# Patient Record
Sex: FEMALE | Race: OTHER | HISPANIC OR LATINO | Employment: FULL TIME | URBAN - METROPOLITAN AREA
[De-identification: names, ages, dates, MRNs, and addresses within clinical notes are randomized per-mention and may not be internally consistent; named-entity substitution may affect disease eponyms.]

---

## 2018-01-13 NOTE — MISCELLANEOUS
Message   Recorded as Task   Date: 06/26/2016 11:13 PM, Created By: Rafal Weber   Task Name: Go to Note   Assigned To: Ricki Chance   Regarding Patient: Adri Diaz, Status: Active   Comment:    Nik Cortez - 26 Jun 2016 11:13 PM     TASK CREATED  GC/chlam neg, please inform pt        Active Problems    1  Birth control counseling (V25 09) (Z30 9)   2  Breast lump on right side at 11 o'clock position (611 72) (N63)   3  Cervical cancer screening (V76 2) (Z12 4)   4  Encounter for routine gynecological examination (V72 31) (Z01 419)   5  History of self breast exam   6  Potential exposure to STD (V01 6) (Z20 2)   7  Screening for HPV (human papillomavirus) (V73 81) (Z11 51)    Current Meds   1  Collagen CAPS; Therapy: (Recorded:22Jun2016) to Recorded   2  Multi-Vitamin Daily Oral Tablet; Therapy: (Recorded:09Feb2015) to Recorded   3  Vitamin E TABS; Therapy: (Recorded:09Feb2015) to Recorded    Allergies    1  Latex Exam Gloves MISC    2   Latex    Signatures   Electronically signed by : Shawn Shahid, ; Jun 27 2016 10:12AM EST                       (Author)

## 2018-01-15 NOTE — MISCELLANEOUS
Message   Recorded as Task   Date: 06/22/2016 10:47 AM, Created By: Mahin Mello   Task Name: Miscellaneous   Assigned To: Nunu Loredo   Regarding Patient: Clifford Lemus, Status: In Progress   Comment:    Nik Cortez - 22 Jun 2016 10:47 AM     TASK CREATED  Patient is interested in C/ [address]  Please check with insurance company about coverage and arrange for insertion  Thanks   [facility] - 22 Jun 2016 2:24 PM     TASK EDITED  C/ [address] is not covered by insurance  Lm for pt   [facility] - 22 Jun 2016 2:24 PM     TASK IN PROGRESS   [facility] - 23 Jun 2016 9:49 AM     TASK EDITED  Tried to reach patient, no answer or machine  Active Problems    1  Birth control counseling (V25 09) (Z30 9)   2  Breast lump on right side at 11 o'clock position (611 72) (N63)   3  Cervical cancer screening (V76 2) (Z12 4)   4  Encounter for routine gynecological examination (V72 31) (Z01 419)   5  History of self breast exam   6  Potential exposure to STD (V01 6) (Z20 2)   7  Screening for HPV (human papillomavirus) (V73 81) (Z11 51)    Current Meds   1  Collagen CAPS; Therapy: (Recorded:22Jun2016) to Recorded   2  Multi-Vitamin Daily Oral Tablet; Therapy: (Recorded:52Psm7075) to Recorded   3  Vitamin E TABS; Therapy: (Recorded:[id number]) to Recorded    Allergies    1  Latex Exam Gloves MISC    2  Latex    Signatures   Electronically signed by :  Rosanna Sevilla, ; Jun 23 2016  9:50AM EST                       (Author)

## 2018-01-29 ENCOUNTER — TRANSCRIBE ORDERS (OUTPATIENT)
Dept: ADMINISTRATIVE | Facility: HOSPITAL | Age: 38
End: 2018-01-29

## 2018-01-29 DIAGNOSIS — N63.10 LUMP OF RIGHT BREAST: ICD-10-CM

## 2018-01-29 DIAGNOSIS — N64.4 BREAST PAIN, RIGHT: Primary | ICD-10-CM

## 2018-01-29 DIAGNOSIS — N64.52 DISCHARGE OF BREAST: ICD-10-CM

## 2018-02-06 ENCOUNTER — HOSPITAL ENCOUNTER (OUTPATIENT)
Dept: MAMMOGRAPHY | Facility: CLINIC | Age: 38
Discharge: HOME | End: 2018-02-06

## 2018-11-23 ENCOUNTER — TELEPHONE (OUTPATIENT)
Dept: OBGYN CLINIC | Facility: CLINIC | Age: 38
End: 2018-11-23

## 2018-11-23 NOTE — TELEPHONE ENCOUNTER
Patient called with message as noted below  She needs to have breast check so we can examine her and figure out what testing to order  Thanks  Ivana Gonzales MD    ----- Message from Deric Robles sent at 11/21/2018 12:42 PM EST -----  Hi the patient mentioned above called requesting a mammo script  She states she needs a script because she has a lump on her left breast  She is scheduled for a yearly on December 21

## 2018-11-26 ENCOUNTER — TELEPHONE (OUTPATIENT)
Dept: OBGYN CLINIC | Facility: CLINIC | Age: 38
End: 2018-11-26

## 2018-11-26 NOTE — TELEPHONE ENCOUNTER
Please call patient to arrange for breast check  We need to see her so we can do an exam and figure out which type of breast imaging she requires    Thanks

## 2018-11-27 ENCOUNTER — OFFICE VISIT (OUTPATIENT)
Dept: OBGYN CLINIC | Facility: CLINIC | Age: 38
End: 2018-11-27
Payer: COMMERCIAL

## 2018-11-27 VITALS
BODY MASS INDEX: 23.39 KG/M2 | HEIGHT: 64 IN | DIASTOLIC BLOOD PRESSURE: 88 MMHG | WEIGHT: 137 LBS | SYSTOLIC BLOOD PRESSURE: 118 MMHG

## 2018-11-27 DIAGNOSIS — B37.3 VAGINAL CANDIDIASIS: ICD-10-CM

## 2018-11-27 DIAGNOSIS — N63.0 BREAST LUMP: ICD-10-CM

## 2018-11-27 DIAGNOSIS — B96.89 BACTERIAL VAGINOSIS: Primary | ICD-10-CM

## 2018-11-27 DIAGNOSIS — N76.0 BACTERIAL VAGINOSIS: Primary | ICD-10-CM

## 2018-11-27 DIAGNOSIS — N89.8 VAGINAL CYST: ICD-10-CM

## 2018-11-27 LAB — SL AMB POCT WET MOUNT: 5

## 2018-11-27 PROCEDURE — 87210 SMEAR WET MOUNT SALINE/INK: CPT | Performed by: OBSTETRICS & GYNECOLOGY

## 2018-11-27 PROCEDURE — 99215 OFFICE O/P EST HI 40 MIN: CPT | Performed by: OBSTETRICS & GYNECOLOGY

## 2018-11-27 RX ORDER — CYANOCOBALAMIN (VITAMIN B-12) 500 MCG
LOZENGE ORAL
COMMUNITY

## 2018-11-27 RX ORDER — FLUCONAZOLE 150 MG/1
150 TABLET ORAL ONCE
Qty: 2 TABLET | Refills: 0 | Status: SHIPPED | OUTPATIENT
Start: 2018-11-27 | End: 2018-11-27

## 2018-11-27 RX ORDER — METRONIDAZOLE 500 MG/1
500 TABLET ORAL 2 TIMES DAILY
Qty: 14 TABLET | Refills: 0 | Status: SHIPPED | OUTPATIENT
Start: 2018-11-27 | End: 2018-12-04

## 2018-11-27 NOTE — PATIENT INSTRUCTIONS
Breast Mass   WHAT YOU NEED TO KNOW:   What do I need to know about a breast mass? A breast mass is a lump or growth in your breast or underarm  A cyst (fluid-filled pocket), an injury, or changes in your breast tissue are the most common causes  A breast mass can also be caused by cancer  You must follow up as directed to find the cause of your breast mass  How is a breast mass evaluated? Your healthcare provider will perform a breast exam to feel the mass  Tell him when you noticed the breast mass, and if it has changed in size  Tell him if you have any other symptoms, such as pain, fever, or nipple discharge  He will ask if you have a personal or family history of breast disease or cancer  He will also ask if you had an injury, biopsy, or surgery on your breast   · Aspiration  is a procedure used to withdraw fluid or cells from your breast mass to be tested for cancer  · A mammogram  is an x-ray to closely examine your breast mass  Healthcare providers will also look for other lumps or tissue changes in your breast      · An ultrasound  uses sound waves to look for a cyst or tumor  Why is it important to continue breast self-exams? Check your breast for changes in size, shape, or feel of the breast tissue  Check under your arms and all around your breasts  If you have monthly periods, examine your breasts after your period is over  Contact your healthcare provider if you notice any changes in your breasts  If you have questions, ask for more information on how to do a breast self-exam         When should I contact my healthcare provider? · Your breast mass returns or grows larger  · You have pain in your breast or underarm  · You have nipple discharge  · You notice other changes to your breasts or nipples, such as dimpling or a rash  · You had an aspiration and you have redness, pain, swelling, or warmth near the aspiration site  · You have a fever       · You have questions or concerns about your condition or care  CARE AGREEMENT:   You have the right to help plan your care  Learn about your health condition and how it may be treated  Discuss treatment options with your caregivers to decide what care you want to receive  You always have the right to refuse treatment  The above information is an  only  It is not intended as medical advice for individual conditions or treatments  Talk to your doctor, nurse or pharmacist before following any medical regimen to see if it is safe and effective for you  © 2017 [employer] Information is for End User's use only and may not be sold, redistributed or otherwise used for commercial purposes  All illustrations and images included in CareNotes® are the copyrighted property of [employer]  or Chris Kimble

## 2018-11-27 NOTE — PROGRESS NOTES
Assessment/Plan:  1  Left breast lump-1 cm fullness noted at 3 o'clock for about 1 cm lateral to the left nipple  It seems to be most consistent with some type of duct cyst for prominent duct  No erythema or warmth or discharge are noted  The patient has noted this ball near nipple and pinching sensation on and off over the past 3 months time  Apparently, she did see Dr Leeanna Cervantes regarding this and diagnostic mammogram and ultrasound of left breast were ordered back in January/February 2018, but they were never done  Request for bilateral diagnostic mammogram and left breast ultrasound was ordered  The patient was instructed to go to the [address] [place] [address],[address] for this  She will follow-up in 1 month time for breast check  2  Bacterial vaginosis/yeast-noted on exam today  Patient was counseled about the findings and treatment options were reviewed  Electronic prescription for metronidazole 500 mg tablets twice daily for 7 days was sent a local pharmacy along with fluconazole 150 mg tablets 2  To take PO x1 with repeat in 1 week time  She was encouraged to avoid alcohol use during this time  3  Vaginal cyst-patient felt a cyst in her vagina  Initially, I could not palpated and thought was a prominent urethra  However, on further palpation, a small 1 cm left vaginal fornix cyst is noted consistent with probable Adriel's duct cyst   The patient was worried about this, but it feels entirely benign  No tenderness or warmth or erythema are appreciated  We will recheck this at the 1 month visit as well  4  Other-patient last seen 2016  She will return in 1 month time for yearly exam along with the breast check/vaginal check as noted above  Visit greater than 45 minutes duration, with greater than 50% time spent counseling, coordinating care, and answering the patient's numerous questions regarding these findings  No problem-specific Assessment & Plan notes found for this encounter         Diagnoses and all orders for this visit:    Bacterial vaginosis  -     metroNIDAZOLE (FLAGYL) 500 mg tablet; Take 1 tablet (500 mg total) by mouth 2 (two) times a day for 7 days  -     POCT wet mount    Vaginal candidiasis  -     fluconazole (DIFLUCAN) 150 mg tablet; Take 1 tablet (150 mg total) by mouth once for 1 dose Repeat in 1 week  -     POCT wet mount    Vaginal cyst    Breast lump  -     Mammo diagnostic bilateral w cad; Future  -     US breast left limited (diagnostic); Future    Other orders  -     Collagen 500 MG CAPS; Take by mouth  -     Multiple Vitamin (MULTI-VITAMIN DAILY PO); Take by mouth  -     Vitamin E 400 units TABS; Take by mouth          Subjective:      Patient ID: Rocío Moffett is a 43 y o  female  Patient was seen today for follow-up visit regarding issues as documented in assessment plan  The following portions of the patient's history were reviewed and updated as appropriate: allergies, current medications, past family history, past medical history, past social history, past surgical history and problem list     Review of Systems   Constitutional: Negative for chills, diaphoresis, fatigue and fever  Respiratory: Negative for apnea, cough, chest tightness, shortness of breath and wheezing  Cardiovascular: Negative for chest pain, palpitations and leg swelling  Gastrointestinal: Negative for abdominal distention, abdominal pain, anal bleeding, constipation, diarrhea, nausea, rectal pain and vomiting  Genitourinary: Positive for vaginal discharge  Negative for difficulty urinating, dyspareunia, dysuria, frequency, hematuria, menstrual problem, pelvic pain, urgency, vaginal bleeding and vaginal pain  Musculoskeletal: Negative for arthralgias, back pain and myalgias  Skin: Negative for color change and rash  Neurological: Negative for dizziness, syncope, light-headedness, numbness and headaches  Hematological: Negative for adenopathy  Does not bruise/bleed easily  Psychiatric/Behavioral: Negative for dysphoric mood and sleep disturbance  The patient is not nervous/anxious  vaginal odor, vaginal cyst, breast lump    Objective:      /88 (BP Location: Right arm, Patient Position: Sitting, Cuff Size: Standard)   Ht 5' 4" (1 626 m)   Wt 62 1 kg (137 lb)   LMP 11/18/2018 (Within Days)   BMI 23 52 kg/m²          Physical Exam    Objective      /88 (BP Location: Right arm, Patient Position: Sitting, Cuff Size: Standard)   Ht 5' 4" (1 626 m)   Wt 62 1 kg (137 lb)   LMP 11/18/2018 (Within Days)   BMI 23 52 kg/m²     General:   alert and oriented, in no acute distress, alert, appears stated age and cooperative   Neck: normal to inspection and palpation   Breast: Symmetric breast implants noted  Right breast without any masses or erythema or discharge appreciated  Left breast with 1 cm fullness at the areola at 3 o'clock, nontender, without any warmth or erythema or fluctuance or discharge  Heart:    Lungs:    Abdomen: soft, non-tender, without masses or organomegaly   Vulva: normal   Vagina: Small amount of white discharge, without erythema noted  1 cm vaginal cyst noted at the vaginal fornix, consistent with probable Adriel's duct cyst   It is nontender on palpation without any warmth or erythema appreciated   Cervix: Small amount of white discharge, without lesions or cervicitis    No Cervical motion tenderness   Uterus: top normal size, anteverted, non-tender   Adnexa: no mass, fullness, tenderness   Rectum: deferred

## 2018-12-21 ENCOUNTER — TELEPHONE (OUTPATIENT)
Dept: OBGYN CLINIC | Facility: CLINIC | Age: 38
End: 2018-12-21

## 2018-12-21 NOTE — TELEPHONE ENCOUNTER
Patient no-show for breast check  This was in follow-up 2 breast finding at the previous visit  Also, I cannot find any imaging studies that were done  Please contact the patient and arrange for her to have studies and follow-up breast check    Thanks

## 2018-12-26 ENCOUNTER — TELEPHONE (OUTPATIENT)
Dept: OBGYN CLINIC | Facility: CLINIC | Age: 38
End: 2018-12-26

## 2020-01-09 ENCOUNTER — DOCTOR'S OFFICE (OUTPATIENT)
Dept: URBAN - METROPOLITAN AREA CLINIC 136 | Facility: CLINIC | Age: 40
Setting detail: OPHTHALMOLOGY
End: 2020-01-09
Payer: COMMERCIAL

## 2020-01-09 DIAGNOSIS — Z41.1: ICD-10-CM

## 2020-01-09 DIAGNOSIS — H04.002: ICD-10-CM

## 2020-01-09 PROCEDURE — 99204 OFFICE O/P NEW MOD 45 MIN: CPT | Performed by: OPHTHALMOLOGY

## 2020-01-09 ASSESSMENT — CONFRONTATIONAL VISUAL FIELD TEST (CVF)
OD_FINDINGS: FULL
OS_FINDINGS: FULL

## 2020-01-09 ASSESSMENT — SUPERFICIAL PUNCTATE KERATITIS (SPK)
OD_SPK: 1+ 2+
OS_SPK: 1+ 2+

## 2020-01-10 ASSESSMENT — REFRACTION_AUTOREFRACTION
OS_AXIS: 92
OS_SPHERE: +0.75
OD_CYLINDER: -0.50
OS_CYLINDER: -0.50
OD_AXIS: 132
OD_SPHERE: +0.25

## 2020-01-10 ASSESSMENT — SPHEQUIV_DERIVED
OS_SPHEQUIV: 0.5
OD_SPHEQUIV: 0

## 2020-01-10 ASSESSMENT — VISUAL ACUITY
OS_BCVA: 20/20
OD_BCVA: 20/20

## 2020-10-13 ENCOUNTER — DOCTOR'S OFFICE (OUTPATIENT)
Dept: URBAN - METROPOLITAN AREA CLINIC 136 | Facility: CLINIC | Age: 40
Setting detail: OPHTHALMOLOGY
End: 2020-10-13
Payer: COMMERCIAL

## 2020-10-13 DIAGNOSIS — Z41.1: ICD-10-CM

## 2020-10-13 DIAGNOSIS — H04.002: ICD-10-CM

## 2020-10-13 PROCEDURE — 92012 INTRM OPH EXAM EST PATIENT: CPT | Performed by: OPHTHALMOLOGY

## 2020-10-13 ASSESSMENT — SUPERFICIAL PUNCTATE KERATITIS (SPK)
OS_SPK: 1+ 2+
OD_SPK: 1+ 2+

## 2020-10-13 ASSESSMENT — SPHEQUIV_DERIVED
OS_SPHEQUIV: 0.5
OD_SPHEQUIV: 0

## 2020-10-13 ASSESSMENT — REFRACTION_AUTOREFRACTION
OD_AXIS: 132
OS_SPHERE: +0.75
OD_SPHERE: +0.25
OD_CYLINDER: -0.50
OS_CYLINDER: -0.50
OS_AXIS: 92

## 2020-10-13 ASSESSMENT — CONFRONTATIONAL VISUAL FIELD TEST (CVF)
OS_FINDINGS: FULL
OD_FINDINGS: FULL

## 2020-10-13 ASSESSMENT — VISUAL ACUITY
OS_BCVA: 20/20
OD_BCVA: 20/20

## 2021-08-12 ENCOUNTER — APPOINTMENT (OUTPATIENT)
Dept: RADIOLOGY | Facility: MEDICAL CENTER | Age: 41
End: 2021-08-12
Payer: COMMERCIAL

## 2021-08-12 ENCOUNTER — OFFICE VISIT (OUTPATIENT)
Dept: URGENT CARE | Facility: MEDICAL CENTER | Age: 41
End: 2021-08-12
Payer: COMMERCIAL

## 2021-08-12 VITALS
WEIGHT: 130 LBS | TEMPERATURE: 99.6 F | OXYGEN SATURATION: 98 % | BODY MASS INDEX: 22.2 KG/M2 | RESPIRATION RATE: 16 BRPM | HEIGHT: 64 IN | SYSTOLIC BLOOD PRESSURE: 135 MMHG | HEART RATE: 66 BPM | DIASTOLIC BLOOD PRESSURE: 92 MMHG

## 2021-08-12 DIAGNOSIS — S83.8X1A SPRAIN OF OTHER LIGAMENT OF RIGHT KNEE, INITIAL ENCOUNTER: Primary | ICD-10-CM

## 2021-08-12 DIAGNOSIS — M25.561 ACUTE PAIN OF RIGHT KNEE: ICD-10-CM

## 2021-08-12 PROCEDURE — 73564 X-RAY EXAM KNEE 4 OR MORE: CPT

## 2021-08-12 PROCEDURE — 99213 OFFICE O/P EST LOW 20 MIN: CPT | Performed by: NURSE PRACTITIONER

## 2021-08-12 RX ORDER — ONDANSETRON 4 MG/1
4 TABLET, ORALLY DISINTEGRATING ORAL EVERY 6 HOURS PRN
Status: SHIPPED | OUTPATIENT
Start: 2021-08-12

## 2021-08-12 RX ORDER — ONDANSETRON 4 MG/1
4 TABLET, FILM COATED ORAL EVERY 8 HOURS PRN
Qty: 20 TABLET | Refills: 0 | Status: SHIPPED | OUTPATIENT
Start: 2021-08-12

## 2021-08-12 RX ADMIN — ONDANSETRON 4 MG: 4 TABLET, ORALLY DISINTEGRATING ORAL at 20:05

## 2021-08-12 NOTE — LETTER
August 12, 2021 3    Patient: Aric Sanchez   YOB: 1980   Date of Visit: 8/12/2021       To Whom It May Concern: It is my medical opinion that Aric Sanchez may return to work on 8/15/2021  If you have any questions or concerns, please don't hesitate to call           Sincerely,        DAVE Ontiveros    CC: No Recipients

## 2021-08-13 NOTE — PATIENT INSTRUCTIONS
Your xray was preliminarily read by your provider  A radiologist will read the xray and you will be notified  You are to Rest, Ice, compression (ace wrap, splint) elevate  Take tylenol or motrin as needed  You are to see your PCP   Go to the ED if symptoms worsen  Take the zofran for nausea as prescribed  You need to see an orthopedic physician as you may need further testing    Knee Pain   WHAT YOU NEED TO KNOW:   Knee pain may start suddenly, or it may be a long-term problem  You may have pain on the side, front, or back of your knee  You may have knee stiffness and swelling  You may hear popping sounds or feel like your knee is giving way or locking up as you walk  You may feel pain when you sit, stand, walk, or climb up and down stairs  Knee pain can be caused by conditions such as obesity, inflammation, or strains or tears in ligaments or tendons  DISCHARGE INSTRUCTIONS:   Return to the emergency department if:   · Your pain is worse, even after treatment  · You cannot bend or straighten your leg completely  · The swelling around your knee does not go down even with treatment  · Your knee is painful and hot to the touch  Contact your healthcare provider if:   · You have questions or concerns about your condition or care  Medicines: You may need any of the following:  · NSAIDs  help decrease swelling and pain or fever  This medicine is available with or without a doctor's order  NSAIDs can cause stomach bleeding or kidney problems in certain people  If you take blood thinner medicine, always ask your healthcare provider if NSAIDs are safe for you  Always read the medicine label and follow directions  · Acetaminophen  decreases pain and fever  It is available without a doctor's order  Ask how much to take and how often to take it  Follow directions   Read the labels of all other medicines you are using to see if they also contain acetaminophen, or ask your doctor or pharmacist  Acetaminophen can cause liver damage if not taken correctly  Do not use more than 4 grams (4,000 milligrams) total of acetaminophen in one day  · Prescription pain medicine  may be given  Ask your healthcare provider how to take this medicine safely  Some prescription pain medicines contain acetaminophen  Do not take other medicines that contain acetaminophen without talking to your healthcare provider  Too much acetaminophen may cause liver damage  Prescription pain medicine may cause constipation  Ask your healthcare provider how to prevent or treat constipation  · Take your medicine as directed  Contact your healthcare provider if you think your medicine is not helping or if you have side effects  Tell him or her if you are allergic to any medicine  Keep a list of the medicines, vitamins, and herbs you take  Include the amounts, and when and why you take them  Bring the list or the pill bottles to follow-up visits  Carry your medicine list with you in case of an emergency  What you can do to manage your symptoms:   · Rest your knee so it can heal   Limit activities that increase your pain  Do low-impact exercises, such as walking or swimming  · Apply ice to help reduce swelling and pain  Use an ice pack, or put crushed ice in a plastic bag  Cover it with a towel before you apply it to your knee  Apply ice for 15 to 20 minutes every hour, or as directed  · Apply compression to help reduce swelling  Use a brace or bandage only as directed  · Elevate your knee to help decrease pain and swelling  Elevate your knee while you are sitting or lying down  Prop your leg on pillows to keep your knee above the level of your heart  · Prevent your knee from moving as directed  Your healthcare provider may put on a cast or splint  You may need to wear a leg brace to stabilize your knee  A leg brace can be adjusted to increase your range of motion as your knee heals         What you can do to prevent knee pain:   · Maintain a healthy weight  Extra weight increases your risk for knee pain  Ask your healthcare provider how much you should weigh  He or she can help you create a safe weight loss plan if you need to lose weight  · Exercise or train properly  Use the correct equipment for sports  Wear shoes that provide good support  Check your posture often as you exercise, play sports, or train for an event  This can help prevent stress and strain on your knees  Rest between sessions so you do not overwork your knees  Follow up with your healthcare provider within 24 hours or as directed: You may need follow-up treatments, such as steroid injections to decrease pain  Write down your questions so you remember to ask them during your visits  © Copyright [employer] 2021 Information is for End User's use only and may not be sold, redistributed or otherwise used for commercial purposes  All illustrations and images included in CareNotes® are the copyrighted property of [employer] M , Inc  or [employer]   The above information is an  only  It is not intended as medical advice for individual conditions or treatments  Talk to your doctor, nurse or pharmacist before following any medical regimen to see if it is safe and effective for you  Knee Immobilizer   WHAT YOU NEED TO KNOW:   A knee immobilizer limits knee movement  It is used after an injury or surgery to help your knee, muscles, or tendons heal    DISCHARGE INSTRUCTIONS:   How to safely use a knee immobilizer:   · Have your knee immobilizer fitted by your healthcare provider  It is important that your knee immobilizer is the right size for you and that it fits properly  · Wear your knee immobilizer as directed  It can be worn over your clothing  Check the fit of the knee immobilizer often  If it does not fit properly or slips out of place, it could cause further injury  · Use crutches as directed    You may need to avoid putting weight on your injured leg  Your healthcare provider will tell you if you need crutches and for how long  · Inspect your knee immobilizer often  Do not wear your knee immobilizer if it is damaged or broken  You may need to replace it if it becomes worn  · Ask your healthcare provider how to care for your knee immobilizer  You may be able to hand wash the fabric with mild soap and water  Do not place it in the washer or dryer  · Go to physical therapy as directed  A physical therapist can help you strengthen the muscles in your leg and help your knee heal     Contact your healthcare provider if:   · Your knee pain becomes worse when you wear your knee immobilizer  · Your skin is sore or raw after you wear your knee immobilizer  · Your leg feels numb or swells while you wear your knee immobilizer  · Your knee immobilizer is damaged  · You have questions or concerns about your condition or care  Return to the emergency department if:   · You have severe swelling or pain in your leg or knee  © Copyright [employer] 2021 Information is for End User's use only and may not be sold, redistributed or otherwise used for commercial purposes  All illustrations and images included in CareNotes® are the copyrighted property of A [employer] M , Inc  or [employer]   The above information is an  only  It is not intended as medical advice for individual conditions or treatments  Talk to your doctor, nurse or pharmacist before following any medical regimen to see if it is safe and effective for you

## 2021-08-13 NOTE — PROGRESS NOTES
[facility]        NAME: Antonette Wiley is a 38 y o  female  : 1980    MRN: [id number]  DATE: 2021  TIME: 8:26 PM    Assessment and Plan   Sprain of other ligament of right knee, initial encounter [S83  [id number]]  1  Sprain of other ligament of right knee, initial encounter  Orthopedic injury treatment    ondansetron (ZOFRAN) 4 mg tablet    Ambulatory referral to Orthopedic Surgery   2  Acute pain of right knee  XR knee 4+ vw right injury    ondansetron (ZOFRAN-ODT) dispersible tablet 4 mg    Orthopedic injury treatment    ondansetron (ZOFRAN) 4 mg tablet    Ambulatory referral to Orthopedic Surgery         Patient Instructions       Follow up with PCP in 3-5 days  Proceed to  ER if symptoms worsen  Your xray was preliminarily read by your provider  A radiologist will read the xray and you will be notified  You are to Rest, Ice, compression (ace wrap, splint) elevate  Take tylenol or motrin as needed  You are to see your PCP   Go to the ED if symptoms worsen  Take the zofran for nausea as prescribed  You need to see an orthopedic physician as you may need further testing            Chief Complaint     Chief Complaint   Patient presents with    Knee Injury     Pt was lifting a case of water and twisted right knee  History of Present Illness       This is a 38year old female who states had a chronic right knee injury as a child  She states that at one time she had to have fluid removed from the knee  Last week she twisted her knee and it seemed to get better then yesterday was lifting a case of water and re twisted the knee and is now having pain  She states that she works 12 hours and was on her knee all day  She states that she is currently nauseated due to the pain  Pt states that she is taking 3 ibuprofen for pain  Review of Systems   Review of Systems   Constitutional: Negative  HENT: Negative  Eyes: Negative  Respiratory: Negative      Cardiovascular: Negative  Gastrointestinal: Negative  Endocrine: Negative  Genitourinary: Negative  Musculoskeletal:        Right knee pain    Skin: Negative  Allergic/Immunologic: Negative  Neurological: Negative  Hematological: Negative  Psychiatric/Behavioral: Negative  Current Medications       Current Outpatient Medications:     Collagen 500 MG CAPS, Take by mouth, Disp: , Rfl:     Multiple Vitamin (MULTI-VITAMIN DAILY PO), Take by mouth, Disp: , Rfl:     ondansetron (ZOFRAN) 4 mg tablet, Take 1 tablet (4 mg total) by mouth every 8 (eight) hours as needed for nausea or vomiting, Disp: 20 tablet, Rfl: 0    Vitamin E 400 units TABS, Take by mouth, Disp: , Rfl:     Current Facility-Administered Medications:     ondansetron (ZOFRAN-ODT) dispersible tablet 4 mg, 4 mg, Oral, Q6H PRN, DAVE Rivero, 4 mg at 08/12/21 2005    Current Allergies     Allergies as of 08/12/2021 - Reviewed 08/12/2021   Allergen Reaction Noted    Latex  02/09/2015            The following portions of the patient's history were reviewed and updated as appropriate: allergies, current medications, past family history, past medical history, past social history, past surgical history and problem list      History reviewed  No pertinent past medical history  Past Surgical History:   Procedure Laterality Date    KNEE SURGERY Right 1994       Family History   Problem Relation Age of Onset    Cancer Son     Breast cancer Paternal Aunt     Uterine cancer Paternal Aunt          Medications have been verified  Objective   /92   Pulse 66   Temp 99 6 °F (37 6 °C) (Tympanic)   Resp 16   Ht 5' 4" (1 626 m)   Wt 59 kg (130 lb)   LMP 08/10/2021 (Approximate)   SpO2 98%   BMI 22 31 kg/m²   Patient's last menstrual period was 08/10/2021 (approximate)  Physical Exam     Physical Exam  Vitals and nursing note reviewed  Constitutional:       General: She is not in acute distress       Appearance: Normal appearance  She is normal weight  She is not ill-appearing, toxic-appearing or diaphoretic  HENT:      Head: Normocephalic and atraumatic  Eyes:      Extraocular Movements: Extraocular movements intact  Cardiovascular:      Rate and Rhythm: Normal rate  Pulses: Normal pulses  Pulmonary:      Effort: Pulmonary effort is normal    Musculoskeletal:         General: Tenderness and signs of injury present  No swelling  Cervical back: Normal range of motion and neck supple  Comments: Right knee pain TTP at lateral joint line  No edema  LROM due to pain  No laxity, negative lachman and drawer   + pedal pulse      Skin:     General: Skin is warm and dry  Capillary Refill: Capillary refill takes less than 2 seconds  Neurological:      General: No focal deficit present  Mental Status: She is alert and oriented to person, place, and time  Psychiatric:         Mood and Affect: Mood normal          Behavior: Behavior normal          Thought Content: Thought content normal          Judgment: Judgment normal            Orthopedic injury treatment    Date/Time: 8/12/2021 8:09 PM  Performed by: DAVE Hale  Authorized by: DAVE Hale     Patient Location:  Clinic  Other Assisting Provider: Yes (comment) (Sol MORATAYA )    Verbal consent obtained?: Yes    Written consent obtained?: Yes    Risks and benefits: Risks, benefits and alternatives were discussed    Consent given by:  Patient  Patient states understanding of procedure being performed: Yes    Patient's understanding of procedure matches consent: Yes    Procedure consent matches procedure scheduled: Yes    Relevant documents present and verified: Yes    Test results available and properly labeled: Yes    Site marked: Yes    Radiology Images displayed and confirmed   If images not available, report reviewed: Yes    Required items: Required blood products, implants, devices and special equipment available Patient identity confirmed:  Verbally with patient and hospital-assigned identification number  Time out: Immediately prior to the procedure a time out was called    Injury location:  Knee  Location details:  Right knee  Injury type:   Soft tissue  Neurovascular status: Neurovascularly intact    Distal perfusion: normal    Neurological function: normal    Range of motion: reduced    Immobilization:  Knee immobilizer and crutches  Neurovascular status: Neurovascularly intact    Distal perfusion: normal    Neurological function: normal    Range of motion: unchanged    Patient tolerance:  Patient tolerated the procedure well with no immediate complications        Preliminary reading   Negative for acute osseous abnormality  Questionable effusion  Waiting on rad read

## 2022-05-16 ENCOUNTER — APPOINTMENT (EMERGENCY)
Dept: RADIOLOGY | Facility: HOSPITAL | Age: 42
End: 2022-05-16
Payer: COMMERCIAL

## 2022-05-16 ENCOUNTER — HOSPITAL ENCOUNTER (EMERGENCY)
Facility: HOSPITAL | Age: 42
Discharge: HOME | End: 2022-05-16
Attending: EMERGENCY MEDICINE
Payer: COMMERCIAL

## 2022-05-16 VITALS
SYSTOLIC BLOOD PRESSURE: 129 MMHG | RESPIRATION RATE: 13 BRPM | HEART RATE: 62 BPM | OXYGEN SATURATION: 99 % | DIASTOLIC BLOOD PRESSURE: 78 MMHG | WEIGHT: 134.48 LBS | TEMPERATURE: 98.3 F | BODY MASS INDEX: 23.08 KG/M2

## 2022-05-16 DIAGNOSIS — R06.00 DYSPNEA: Primary | ICD-10-CM

## 2022-05-16 LAB
2HR DELTA HS TROPONIN: 0 NG/L
ANION GAP SERPL CALCULATED.3IONS-SCNC: 7 MMOL/L (ref 4–13)
ATRIAL RATE: 70 BPM
BASOPHILS # BLD AUTO: 0.01 THOUSANDS/ΜL (ref 0–0.1)
BASOPHILS NFR BLD AUTO: 0 % (ref 0–1)
BUN SERPL-MCNC: 13 MG/DL (ref 5–25)
CALCIUM SERPL-MCNC: 8.6 MG/DL (ref 8.3–10.1)
CARDIAC TROPONIN I PNL SERPL HS: 4 NG/L
CARDIAC TROPONIN I PNL SERPL HS: 4 NG/L
CHLORIDE SERPL-SCNC: 106 MMOL/L (ref 100–108)
CO2 SERPL-SCNC: 28 MMOL/L (ref 21–32)
CREAT SERPL-MCNC: 0.66 MG/DL (ref 0.6–1.3)
EOSINOPHIL # BLD AUTO: 0.02 THOUSAND/ΜL (ref 0–0.61)
EOSINOPHIL NFR BLD AUTO: 1 % (ref 0–6)
ERYTHROCYTE [DISTWIDTH] IN BLOOD BY AUTOMATED COUNT: 13.6 % (ref 11.6–15.1)
GFR SERPL CREATININE-BSD FRML MDRD: 109 ML/MIN/1.73SQ M
GLUCOSE SERPL-MCNC: 100 MG/DL (ref 65–140)
HCT VFR BLD AUTO: 37.9 % (ref 34.8–46.1)
HGB BLD-MCNC: 12.1 G/DL (ref 11.5–15.4)
IMM GRANULOCYTES # BLD AUTO: 0.01 THOUSAND/UL (ref 0–0.2)
IMM GRANULOCYTES NFR BLD AUTO: 0 % (ref 0–2)
LYMPHOCYTES # BLD AUTO: 1.57 THOUSANDS/ΜL (ref 0.6–4.47)
LYMPHOCYTES NFR BLD AUTO: 43 % (ref 14–44)
MCH RBC QN AUTO: 28.7 PG (ref 26.8–34.3)
MCHC RBC AUTO-ENTMCNC: 31.9 G/DL (ref 31.4–37.4)
MCV RBC AUTO: 90 FL (ref 82–98)
MONOCYTES # BLD AUTO: 0.23 THOUSAND/ΜL (ref 0.17–1.22)
MONOCYTES NFR BLD AUTO: 6 % (ref 4–12)
NEUTROPHILS # BLD AUTO: 1.84 THOUSANDS/ΜL (ref 1.85–7.62)
NEUTS SEG NFR BLD AUTO: 50 % (ref 43–75)
NRBC BLD AUTO-RTO: 0 /100 WBCS
P AXIS: 56 DEGREES
PLATELET # BLD AUTO: 220 THOUSANDS/UL (ref 149–390)
PMV BLD AUTO: 8.9 FL (ref 8.9–12.7)
POTASSIUM SERPL-SCNC: 3.5 MMOL/L (ref 3.5–5.3)
PR INTERVAL: 162 MS
QRS AXIS: 64 DEGREES
QRSD INTERVAL: 100 MS
QT INTERVAL: 410 MS
QTC INTERVAL: 442 MS
RBC # BLD AUTO: 4.21 MILLION/UL (ref 3.81–5.12)
SODIUM SERPL-SCNC: 141 MMOL/L (ref 136–145)
T WAVE AXIS: 76 DEGREES
VENTRICULAR RATE: 70 BPM
WBC # BLD AUTO: 3.68 THOUSAND/UL (ref 4.31–10.16)

## 2022-05-16 PROCEDURE — 99285 EMERGENCY DEPT VISIT HI MDM: CPT | Performed by: EMERGENCY MEDICINE

## 2022-05-16 PROCEDURE — 99285 EMERGENCY DEPT VISIT HI MDM: CPT

## 2022-05-16 PROCEDURE — 93005 ELECTROCARDIOGRAM TRACING: CPT

## 2022-05-16 PROCEDURE — 36415 COLL VENOUS BLD VENIPUNCTURE: CPT | Performed by: EMERGENCY MEDICINE

## 2022-05-16 PROCEDURE — 85025 COMPLETE CBC W/AUTO DIFF WBC: CPT | Performed by: EMERGENCY MEDICINE

## 2022-05-16 PROCEDURE — 93010 ELECTROCARDIOGRAM REPORT: CPT | Performed by: INTERNAL MEDICINE

## 2022-05-16 PROCEDURE — 84484 ASSAY OF TROPONIN QUANT: CPT | Performed by: EMERGENCY MEDICINE

## 2022-05-16 PROCEDURE — 80048 BASIC METABOLIC PNL TOTAL CA: CPT | Performed by: EMERGENCY MEDICINE

## 2022-05-16 PROCEDURE — U0003 INFECTIOUS AGENT DETECTION BY NUCLEIC ACID (DNA OR RNA); SEVERE ACUTE RESPIRATORY SYNDROME CORONAVIRUS 2 (SARS-COV-2) (CORONAVIRUS DISEASE [COVID-19]), AMPLIFIED PROBE TECHNIQUE, MAKING USE OF HIGH THROUGHPUT TECHNOLOGIES AS DESCRIBED BY CMS-2020-01-R: HCPCS | Performed by: EMERGENCY MEDICINE

## 2022-05-16 PROCEDURE — 71045 X-RAY EXAM CHEST 1 VIEW: CPT

## 2022-05-16 PROCEDURE — U0005 INFEC AGEN DETEC AMPLI PROBE: HCPCS | Performed by: EMERGENCY MEDICINE

## 2022-05-16 RX ORDER — LORAZEPAM 0.5 MG/1
0.5 TABLET ORAL ONCE
Status: COMPLETED | OUTPATIENT
Start: 2022-05-16 | End: 2022-05-16

## 2022-05-16 RX ADMIN — LORAZEPAM 0.5 MG: 0.5 TABLET ORAL at 17:21

## 2022-05-16 NOTE — ED PROVIDER NOTES
History  Chief Complaint   Patient presents with    Shortness of Breath     Pt reports intermittent SOB, and dizziness since this morning     A 43-year-old female with no significant past medical history; presents with shortness of breath that began today  Patient states she was driving her car, when she developed a pressure-like sensation in her head along with shortness of breath  Patient states she took several deep breaths, and drink water with resolution of her symptoms  Patient states since then she has had several similar episodes, all which resolve after drinking water  Patient does report having a mild cough and postnasal drip over the past several days  Patient states yesterday she took two at home COVID tests, one of which was positive and one was negative  Patient has otherwise not had fever, chills, chest pain, abdominal pain, nausea, vomiting, diarrhea, peripheral edema and rashes  Patient has never had similar symptoms  A/P:  Dyspnea, with recent cough and postnasal drip  Patient overall resting comfortably, no acute respiratory distress  Suspect her episodes of shortness of breath may be more related to anxiety, however given her reported COVID test will check lab work and chest x-ray for further evaluation  Will give a dose of Ativan and reassess  History provided by:  Patient and medical records  Shortness of Breath  Associated symptoms: cough and headaches        Prior to Admission Medications   Prescriptions Last Dose Informant Patient Reported? Taking?    Collagen 500 MG CAPS   Yes No   Sig: Take by mouth   Multiple Vitamin (MULTI-VITAMIN DAILY PO)   Yes No   Sig: Take by mouth   Vitamin E 400 units TABS   Yes No   Sig: Take by mouth   ondansetron (ZOFRAN) 4 mg tablet   No No   Sig: Take 1 tablet (4 mg total) by mouth every 8 (eight) hours as needed for nausea or vomiting      Facility-Administered Medications Last Administration Doses Remaining   ondansetron (ZOFRAN-ODT) dispersible tablet 4 mg 8/12/2021  8:05 PM           History reviewed  No pertinent past medical history  Past Surgical History:   Procedure Laterality Date    KNEE SURGERY Right 1994       Family History   Problem Relation Age of Onset    Cancer Son     Breast cancer Paternal Aunt     Uterine cancer Paternal Aunt      I have reviewed and agree with the history as documented  E-Cigarette/Vaping     E-Cigarette/Vaping Substances     Social History     Tobacco Use    Smoking status: Never Smoker    Smokeless tobacco: Never Used   Substance Use Topics    Alcohol use: No    Drug use: No       Review of Systems   Respiratory: Positive for cough and shortness of breath  Neurological: Positive for headaches  All other systems reviewed and are negative        Physical Exam  Physical Exam  General: awake, alert, no acute distress  Head: normocephalic, atraumatic  Eyes: no scleral icterus  Ears: external ears normal, hearing grossly intact  Nose: external exam grossly normal, negative nasal discharge  Neck: symmetric, No JVD noted, trachea midline  Pulmonary: no respiratory distress, no tachypnea noted  Cardiovascular: appears well perfused  Abdomen: no distention noted  Musculoskeletal: no deformities noted, tone normal  Neuro: grossly non-focal, pt voluntarily moving bilateral upper and lower extremities equally and purposefully  Psych: mood and affect appropriate      Vital Signs  ED Triage Vitals [05/16/22 1556]   Temperature Pulse Respirations Blood Pressure SpO2   98 3 °F (36 8 °C) 67 19 170/94 99 %      Temp Source Heart Rate Source Patient Position - Orthostatic VS BP Location FiO2 (%)   Oral Monitor Lying Right arm --      Pain Score       --           Vitals:    05/16/22 1830 05/16/22 1925 05/16/22 1956 05/16/22 2053   BP: 130/82 132/77  129/78   Pulse: 64 60 60 62   Patient Position - Orthostatic VS: Lying Lying  Sitting         Visual Acuity      ED Medications  Medications   LORazepam (ATIVAN) tablet 0 5 mg (0 5 mg Oral Given 5/16/22 1721)       Diagnostic Studies  Results Reviewed     Procedure Component Value Units Date/Time    HS Troponin I 2hr [[id number]]  (Normal) Collected: 05/16/22 1954    Lab Status: Final result Specimen: Blood from Arm, Right Updated: 05/16/22 2041     hs TnI 2hr 4 ng/L      Delta 2hr hsTnI 0 ng/L     HS Troponin 0hr (reflex protocol) [[id number]]  (Normal) Collected: 05/16/22 1736    Lab Status: Final result Specimen: Blood from Arm, Left Updated: 05/16/22 1809     hs TnI 0hr 4 ng/L     Basic metabolic panel [[id number]] Collected: 05/16/22 1701    Lab Status: Final result Specimen: Blood from Arm, Left Updated: 05/16/22 1716     Sodium 141 mmol/L      Potassium 3 5 mmol/L      Chloride 106 mmol/L      CO2 28 mmol/L      ANION GAP 7 mmol/L      BUN 13 mg/dL      Creatinine 0 66 mg/dL      Glucose 100 mg/dL      Calcium 8 6 mg/dL      eGFR 109 ml/min/1 73sq m     Narrative:      [facility] guidelines for Chronic Kidney Disease (CKD):     Stage 1 with normal or high GFR (GFR > 90 mL/min/1 73 square meters)    Stage 2 Mild CKD (GFR = 60-89 mL/min/1 73 square meters)    Stage 3A Moderate CKD (GFR = 45-59 mL/min/1 73 square meters)    Stage 3B Moderate CKD (GFR = 30-44 mL/min/1 73 square meters)    Stage 4 Severe CKD (GFR = 15-29 mL/min/1 73 square meters)    Stage 5 End Stage CKD (GFR <15 mL/min/1 73 square meters)  Note: GFR calculation is accurate only with a steady state creatinine    CBC and differential [[id number]]  (Abnormal) Collected: 05/16/22 1701    Lab Status: Final result Specimen: Blood from Arm, Left Updated: 05/16/22 1707     WBC 3 68 Thousand/uL      RBC 4 21 Million/uL      Hemoglobin 12 1 g/dL      Hematocrit 37 9 %      MCV 90 fL      MCH 28 7 pg      MCHC 31 9 g/dL      RDW 13 6 %      MPV 8 9 fL      Platelets 408 Thousands/uL      nRBC 0 /100 WBCs      Neutrophils Relative 50 %      Immat GRANS % 0 %      Lymphocytes Relative 43 % Monocytes Relative 6 %      Eosinophils Relative 1 %      Basophils Relative 0 %      Neutrophils Absolute 1 84 Thousands/µL      Immature Grans Absolute 0 01 Thousand/uL      Lymphocytes Absolute 1 57 Thousands/µL      Monocytes Absolute 0 23 Thousand/µL      Eosinophils Absolute 0 02 Thousand/µL      Basophils Absolute 0 01 Thousands/µL     COVID only - 48 hour TAT [[id number]] Collected: 05/16/22 1704    Lab Status: In process Specimen: Nares from Nasopharyngeal Swab Updated: 05/16/22 1707                 XR chest 1 view portable   ED Interpretation by Shady Flores DO (05/16 1817)   No acute disease      Final Result by Lila Zeng MD (05/16 1953)      No acute cardiopulmonary disease  Workstation performed: [id number]                    Procedures  Procedures   ECG 12 Lead Documentation  Date/Time: today/date: 5/17/2022  Performed by: Catherine Delgadillo    ECG reviewed by me, the ED Provider: yes    Patient location:  ED   Previous ECG:  No old for comparison   Rate:  70  ECG rate assessment: normal    Rhythm: sinus rhythm    Ectopy:  none    QRS axis:  Normal  Intervals: normal   Q waves: None   ST segments:  Nonspecific changes throughout  T waves: normal      Impression: NSR with nonspecific ST changes throughout          ED Course  ED Course as of 05/17/22 0009   Mon May 16, 2022   1724 Labs WNL   1727 Given abnormal EKG, will add on troponin and plan for delta to compare   1839 Pt feeling better following ativan  Updated on lab and CXR results  Agreeable to delta  2045 hs TnI 2hr: 4  Delta 0  Labs and imaging reassuring, repeat COVID swab pending  Suspect dyspnea is due to anxiety  Will proceed with discharge home, recommending PCP follow up                         Rabia  14  Rule for PE    Flowsheet Row Most Recent Value   PERC Rule for PE    Age >=50 0 Filed at: 05/16/2022 1630   HR >=100 0 Filed at: 05/16/2022 1630   O2 Sat on room air < 95% 0 Filed at: 05/16/2022 1630 History of PE or DVT 0 Filed at: 05/16/2022 1630   Recent trauma or surgery 0 Filed at: 05/16/2022 1630   Hemoptysis 0 Filed at: 05/16/2022 1630   Exogenous estrogen 0 Filed at: 05/16/2022 1630   Unilateral leg swelling 0 Filed at: 05/16/2022 1630   PERC Rule for PE Results 0 Filed at: 05/16/2022 1630              SBIRT 22yo+    Flowsheet Row Most Recent Value   SBIRT (25 yo +)    In order to provide better care to our patients, we are screening all of our patients for alcohol and drug use  Would it be okay to ask you these screening questions? No Filed at: 05/16/2022 1622                    MDM    Disposition  Final diagnoses:   Dyspnea     Time reflects when diagnosis was documented in both MDM as applicable and the Disposition within this note     Time User Action Codes Description Comment    5/16/2022  8:46 PM Tata [place] Add [R06 00] Dyspnea       ED Disposition     ED Disposition   Discharge    Condition   Stable    Date/Time   Mon May 16, 2022  8:46 PM    Comment   Davey Goldmann discharge to home/self care                 Follow-up Information     Follow up With Specialties Details Why Contact Info Additional [facility] Schedule an appointment as soon as possible for a visit  To establish care with a family physician [address], [address], [address], [address], [address] [place] [address]          Discharge Medication List as of 5/16/2022  8:46 PM      CONTINUE these medications which have NOT CHANGED    Details   Collagen 500 MG CAPS Take by mouth, Historical Med      Multiple Vitamin (MULTI-VITAMIN DAILY PO) Take by mouth, Historical Med      ondansetron (ZOFRAN) 4 mg tablet Take 1 tablet (4 mg total) by mouth every 8 (eight) hours as needed for nausea or vomiting, Starting Thu 8/12/2021, Normal      Vitamin E 400 units TABS Take by mouth, Historical Med             No discharge procedures on file      PDMP Review     None          ED Provider  Electronically Signed by           Lady Vineet DO  05/17/22 0009

## 2022-05-17 LAB
ATRIAL RATE: 61 BPM
P AXIS: 66 DEGREES
PR INTERVAL: 162 MS
QRS AXIS: 67 DEGREES
QRSD INTERVAL: 96 MS
QT INTERVAL: 418 MS
QTC INTERVAL: 420 MS
SARS-COV-2 RNA RESP QL NAA+PROBE: POSITIVE
T WAVE AXIS: 81 DEGREES
VENTRICULAR RATE: 61 BPM

## 2022-05-17 PROCEDURE — 93010 ELECTROCARDIOGRAM REPORT: CPT | Performed by: INTERNAL MEDICINE

## 2022-05-17 NOTE — ED NOTES
AVS reviewed with pt by provider, pt verbalized understanding of d/c instructions  VSS   Pt left ambulatory with steady gait; alert and oriented     James García RN  05/16/22 4299

## 2024-01-12 ENCOUNTER — OCCMED (OUTPATIENT)
Dept: URGENT CARE | Facility: MEDICAL CENTER | Age: 44
End: 2024-01-12

## 2024-01-12 DIAGNOSIS — R94.120 ABNORMAL AUDIOGRAM: Primary | ICD-10-CM
